# Patient Record
Sex: MALE | Race: ASIAN | NOT HISPANIC OR LATINO | URBAN - METROPOLITAN AREA
[De-identification: names, ages, dates, MRNs, and addresses within clinical notes are randomized per-mention and may not be internally consistent; named-entity substitution may affect disease eponyms.]

---

## 2021-11-30 ENCOUNTER — EMERGENCY (EMERGENCY)
Facility: HOSPITAL | Age: 46
LOS: 1 days | Discharge: HOME | End: 2021-11-30
Attending: EMERGENCY MEDICINE | Admitting: EMERGENCY MEDICINE
Payer: COMMERCIAL

## 2021-11-30 VITALS
TEMPERATURE: 99 F | RESPIRATION RATE: 16 BRPM | DIASTOLIC BLOOD PRESSURE: 74 MMHG | SYSTOLIC BLOOD PRESSURE: 112 MMHG | OXYGEN SATURATION: 99 % | HEART RATE: 60 BPM

## 2021-11-30 VITALS
DIASTOLIC BLOOD PRESSURE: 72 MMHG | TEMPERATURE: 99 F | SYSTOLIC BLOOD PRESSURE: 98 MMHG | OXYGEN SATURATION: 100 % | HEART RATE: 73 BPM | RESPIRATION RATE: 16 BRPM

## 2021-11-30 PROCEDURE — 99284 EMERGENCY DEPT VISIT MOD MDM: CPT

## 2021-11-30 PROCEDURE — 72040 X-RAY EXAM NECK SPINE 2-3 VW: CPT | Mod: 26

## 2021-11-30 RX ORDER — IBUPROFEN 200 MG
600 TABLET ORAL ONCE
Refills: 0 | Status: COMPLETED | OUTPATIENT
Start: 2021-11-30 | End: 2021-11-30

## 2021-11-30 RX ORDER — DIAZEPAM 5 MG
5 TABLET ORAL ONCE
Refills: 0 | Status: DISCONTINUED | OUTPATIENT
Start: 2021-11-30 | End: 2021-11-30

## 2021-11-30 RX ADMIN — Medication 600 MILLIGRAM(S): at 11:29

## 2021-11-30 RX ADMIN — Medication 600 MILLIGRAM(S): at 09:07

## 2021-11-30 RX ADMIN — Medication 5 MILLIGRAM(S): at 09:04

## 2021-11-30 NOTE — ED ADULT TRIAGE NOTE - CHIEF COMPLAINT QUOTE
Pt arrives to ED c/o chest pain and occipital pain for three days.  Left sided CP with no radiation.  Pt left hand is in spasms.  Pt seen at [facility] yesterday for same issues and d/c with Tylenol, ASA and Robaxin.

## 2021-11-30 NOTE — ED PROVIDER NOTE - OBJECTIVE STATEMENT
45 y/o m with no pmhx complains of b/l neck pain, b/l shoulder pain. pt hd stable. states was evaluated at [facility], received a chest xray, muscle relaxants and then states felt better but the pain returned. pt states he feels when the pain comes, it feels electrical. radiating into head with headache. pt moving head and neck in all directions, spontaneous movt. no nuchal rigidity, afebrile. contrary to nursing documentation pt with no chest pain, no sob, no headache. neg encephalitis. no recent travel .  states the pain is localized to the muscle areas. 10/10 pain as stated by pt while he is sitting comfortably on phone. states it is too much for too long, although pain has persisted for 2 days.

## 2021-11-30 NOTE — ED PROVIDER NOTE - CLINICAL SUMMARY MEDICAL DECISION MAKING FREE TEXT BOX
pt p/w b/l paraspinal neck, with upper neck pain, no midline ttp, no neuro deficits, no chest pain. hd stable, neck pain improved. vss. referred to ortho for outpt mri. no red flags.

## 2021-11-30 NOTE — ED PROVIDER NOTE - RESPIRATORY NEGATIVE STATEMENT, MLM
no chest pain, no cough, and no shortness of breath. +b/l paraspinal muscle ttp. paraspinal neck pain.

## 2022-08-05 NOTE — ED PROVIDER NOTE - PATIENT PORTAL LINK FT
Patient's HM shows they are overdue for [place] [id number] and  files searched. No results to attach to order nor HM updated.
You can access the FollowMyHealth Patient Portal offered by [facility] by registering at the following website: [email or web address]. By joining [facility]’s FollowMyHealth portal, you will also be able to view your health information using other applications (apps) compatible with our system.

## 2023-04-04 ENCOUNTER — EMERGENCY (EMERGENCY)
Facility: HOSPITAL | Age: 48
LOS: 1 days | Discharge: HOME | End: 2023-04-04
Attending: EMERGENCY MEDICINE | Admitting: EMERGENCY MEDICINE
Payer: MEDICAID

## 2023-04-04 VITALS
SYSTOLIC BLOOD PRESSURE: 101 MMHG | OXYGEN SATURATION: 100 % | HEART RATE: 52 BPM | RESPIRATION RATE: 17 BRPM | TEMPERATURE: 98 F | DIASTOLIC BLOOD PRESSURE: 55 MMHG

## 2023-04-04 VITALS
OXYGEN SATURATION: 100 % | HEART RATE: 78 BPM | DIASTOLIC BLOOD PRESSURE: 70 MMHG | RESPIRATION RATE: 18 BRPM | TEMPERATURE: 98 F | SYSTOLIC BLOOD PRESSURE: 140 MMHG

## 2023-04-04 PROBLEM — R25.2 CRAMP AND SPASM: Chronic | Status: ACTIVE | Noted: 2021-11-30

## 2023-04-04 PROCEDURE — 76870 US EXAM SCROTUM: CPT | Mod: 26

## 2023-04-04 PROCEDURE — 76770 US EXAM ABDO BACK WALL COMP: CPT | Mod: 26

## 2023-04-04 PROCEDURE — 99284 EMERGENCY DEPT VISIT MOD MDM: CPT

## 2023-04-04 RX ORDER — DIAZEPAM 5 MG
1 TABLET ORAL
Qty: 9 | Refills: 0
Start: 2023-04-04 | End: 2023-04-06

## 2023-04-04 RX ORDER — LIDOCAINE 4 G/100G
1 CREAM TOPICAL ONCE
Refills: 0 | Status: COMPLETED | OUTPATIENT
Start: 2023-04-04 | End: 2023-04-04

## 2023-04-04 RX ORDER — IBUPROFEN 200 MG
1 TABLET ORAL
Qty: 28 | Refills: 0
Start: 2023-04-04 | End: 2023-04-10

## 2023-04-04 RX ORDER — IBUPROFEN 200 MG
600 TABLET ORAL ONCE
Refills: 0 | Status: COMPLETED | OUTPATIENT
Start: 2023-04-04 | End: 2023-04-04

## 2023-04-04 RX ORDER — DIAZEPAM 5 MG
5 TABLET ORAL ONCE
Refills: 0 | Status: DISCONTINUED | OUTPATIENT
Start: 2023-04-04 | End: 2023-04-04

## 2023-04-04 RX ORDER — ACETAMINOPHEN 500 MG
650 TABLET ORAL ONCE
Refills: 0 | Status: COMPLETED | OUTPATIENT
Start: 2023-04-04 | End: 2023-04-04

## 2023-04-04 RX ADMIN — Medication 650 MILLIGRAM(S): at 11:27

## 2023-04-04 RX ADMIN — Medication 650 MILLIGRAM(S): at 10:42

## 2023-04-04 RX ADMIN — Medication 600 MILLIGRAM(S): at 10:42

## 2023-04-04 RX ADMIN — LIDOCAINE 1 PATCH: 4 CREAM TOPICAL at 10:42

## 2023-04-04 RX ADMIN — Medication 600 MILLIGRAM(S): at 11:27

## 2023-04-04 RX ADMIN — Medication 5 MILLIGRAM(S): at 10:42

## 2023-04-04 NOTE — ED ADULT NURSE NOTE - OBJECTIVE STATEMENT
47 year old male received to intake rm 10C AAOx4 ambulatory. Pt c/o acute on chronic lower back pain radiating to testicles and b/l lower extremities rating 10/10. Pt's friend drove him to ER because he is having numbness to left posterior knee. Denies recent injury/trauma/fall. Pt denies urinary symptoms. Pt denies headache, dizziness, chest pain, shortness of breath, n/v/d, fever. Respirations even and unlabored. Pt medicated per MD orders. VS as noted. Pt brought to US

## 2023-04-04 NOTE — ED PROVIDER NOTE - NS ED ATTENDING STATEMENT MOD
This was a shared visit with the PAOLO. I reviewed and verified the documentation and independently performed the documented:

## 2023-04-04 NOTE — ED PROVIDER NOTE - ATTENDING APP SHARED VISIT CONTRIBUTION OF CARE
47-year-old male without significant past medical history reports history intermittent/chronic back pain times years here today complaining of worsening bilateral lower back pain worse with right greater than left radiating into the bilateral testicles, again right greater than left x2 days.  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 Reports pain also radiates down b/l posterior legs with tingling and stiffness in muscles. Denies fever, chills, nausea, vomiting, diarrhea, dysuria, hematuria, incontinence

## 2023-04-04 NOTE — ED PROVIDER NOTE - NSFOLLOWUPINSTRUCTIONS_ED_ALL_ED_FT
Follow with your PMD within 48-72 hours.      Rest, no heavy lifting.      Warm compresses to area.    Recommend Ortho consult to discuss possible MRI vs Physical Therapy- referral list provided.  Light walking.     Take Motrin 600 mg every 8 hours for pain with food,     Valium 5mg every 8 hours as needed for muscle spasm- caution drowsiness/do not drive.     Any worsening pain, weakness, numbness, bowel or urinary incontinence return to ER     Follow up with Urology for Varicocele    Varicocele    A varicocele is a swelling of veins in the scrotum. The scrotum is the sac that contains the testicles. Varicoceles can occur on either side of the scrotum, but they are more common on the left side. They occur most often in teenage boys and young men.    In most cases, varicoceles are not a serious problem. They are usually small and painless and do not require treatment. Tests may be done to confirm the diagnosis. Treatment may be needed if:  A varicocele is large, causes a lot of pain, or causes pain when exercising.  Varicoceles are found on both sides of the scrotum.  A varicocele causes a decrease in the size of the testicle in a growing adolescent.  The person has fertility problems.  What are the causes?  This condition is the result of valves in the veins not working properly. Valves in the veins help to return blood from the scrotum and testicles to the heart. If these valves do not work well, blood flows backward and backs up into the veins, which causes the veins to swell. This is similar to what happens when varicose veins form in the leg.    What are the signs or symptoms?  Most varicoceles do not cause any symptoms. If symptoms do occur, they may include:  Swelling on one side of the scrotum. The swelling may be more obvious when you are standing up.  A lumpy feeling in the scrotum.  A heavy feeling on one side of the scrotum.  A dull ache in the scrotum, especially after exercise or prolonged standing or sitting.  Slower growth or reduced size of the testicle on the side of the varicocele (in young males).  Problems with fathering a child (fertility). This can occur if the testicle does not grow normally or if the condition causes problems with the sperm, such as a low sperm count or sperm that are not able to reach the egg (poor motility).  How is this diagnosed?  This condition is diagnosed based on:  Your medical history.  A physical exam. Your health care provider may inspect and feel (palpate) the scrotal area to check for swollen or enlarged veins.  An ultrasound. This may be done to confirm the diagnosis and to help rule out other causes of the swelling.  How is this treated?  Treatment is usually not needed for this condition. If you have any pain, your health care provider may prescribe or recommend medicine to help relieve it. You may need regular exams so your health care provider can monitor the varicocele to ensure that it does not cause problems.    When further treatment is needed, it may involve one of these options:  Varicocelectomy. This is a surgery in which the swollen veins are tied off so that the flow of blood goes to other veins instead.  Embolization. In this procedure, a small, thin tube (catheter) is used to place metal coils or other blocking items in the veins. This cuts off the blood flow to the swollen veins.  Follow these instructions at home:  Take over-the-counter and prescription medicines only as told by your health care provider.  Wear supportive underwear.  Use an athletic supporter when participating in sports activities.  Keep all follow-up visits as told by your health care provider. This is important.  Contact a health care provider if:  Your pain is increasing.  You have redness in the affected area.  Your testicle becomes enlarged, swollen, or painful.  You have swelling that does not decrease when you are lying down.  One of your testicles is smaller than the other.  Get help right away if:  You develop swelling in your legs.  You have difficulty breathing.  Summary  Varicocele is a condition in which the veins in the scrotum are swollen or enlarged.  In most cases, varicoceles do not require treatment.  Treatment may be needed if you have pain, have problems with infertility, or have a smaller testicle associated with the varicocele.  In some cases, the condition may be treated with a procedure to cut off the flow of blood to the swollen veins.  This information is not intended to replace advice given to you by your health care provider. Make sure you discuss any questions you have with your health care provider.

## 2023-04-04 NOTE — ED ADULT TRIAGE NOTE - CHIEF COMPLAINT QUOTE
Pt with co lower back pain since last night pain radiates into testicle area. Pt states no swelling to testicles no discharge  from penis no urinary problem. Pt denies nausea or vomiting no diarrhea. Pt states pain also radiates to legs.

## 2023-04-04 NOTE — ED PROVIDER NOTE - PATIENT PORTAL LINK FT
You can access the FollowMyHealth Patient Portal offered by [facility] by registering at the following website: [email or web address]. By joining [facility]’s FollowMyHealth portal, you will also be able to view your health information using other applications (apps) compatible with our system.

## 2023-04-04 NOTE — ED PROVIDER NOTE - PROGRESS NOTE DETAILS
Patient feeling improved after medication.  Ultrasound revealed no hydro nephrosis, no hydrocele no torsion no epididymitis.  It did show right-sided varicocele.  Will set patient up with urology as an outpatient and will DC with few days of medication.  Patient is amenable to plan for discharge and will follow-up with urology and primary medical doctor. Discharge center helping with urology f/u

## 2023-04-04 NOTE — ED PROVIDER NOTE - CLINICAL SUMMARY MEDICAL DECISION MAKING FREE TEXT BOX
47-year-old male without significant past medical history reports history intermittent/chronic back pain times years here today complaining of worsening bilateral lower back pain worse with right greater than left radiating into the bilateral testicles, again right greater than left x2 days.  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 Reports pain also radiates down b/l posterior legs with tingling and stiffness in muscles.   plan meds, US testicles, US renal, reassess, likely outpatient, PMD/ Ortho f/u

## 2024-04-22 NOTE — ED PROVIDER NOTE - CROS ED HEME ALL NEG
The patient's goals for the shift include  comfort and support    The clinical goals for the shift include  comfort and support       negative...

## 2024-12-03 ENCOUNTER — EMERGENCY (EMERGENCY)
Facility: HOSPITAL | Age: 49
LOS: 1 days | Discharge: HOME | End: 2024-12-03
Attending: STUDENT IN AN ORGANIZED HEALTH CARE EDUCATION/TRAINING PROGRAM | Admitting: EMERGENCY MEDICINE
Payer: MEDICAID

## 2024-12-03 VITALS
HEIGHT: 66 IN | HEART RATE: 75 BPM | DIASTOLIC BLOOD PRESSURE: 88 MMHG | TEMPERATURE: 98 F | OXYGEN SATURATION: 97 % | SYSTOLIC BLOOD PRESSURE: 133 MMHG | RESPIRATION RATE: 18 BRPM | WEIGHT: 128.09 LBS

## 2024-12-03 LAB
ADD ON TEST-SPECIMEN IN LAB: SIGNIFICANT CHANGE UP
ANION GAP SERPL CALC-SCNC: 13 MMOL/L — SIGNIFICANT CHANGE UP (ref 7–14)
BASOPHILS # BLD AUTO: 0.03 K/UL — SIGNIFICANT CHANGE UP (ref 0–0.2)
BASOPHILS NFR BLD AUTO: 0.4 % — SIGNIFICANT CHANGE UP (ref 0–2)
BUN SERPL-MCNC: 16 MG/DL — SIGNIFICANT CHANGE UP (ref 7–23)
CALCIUM SERPL-MCNC: 9.3 MG/DL — SIGNIFICANT CHANGE UP (ref 8.4–10.5)
CHLORIDE SERPL-SCNC: 106 MMOL/L — SIGNIFICANT CHANGE UP (ref 98–107)
CO2 SERPL-SCNC: 21 MMOL/L — LOW (ref 22–31)
CREAT SERPL-MCNC: 0.92 MG/DL — SIGNIFICANT CHANGE UP (ref 0.5–1.3)
EGFR: 102 ML/MIN/1.73M2 — SIGNIFICANT CHANGE UP
EOSINOPHIL # BLD AUTO: 0.06 K/UL — SIGNIFICANT CHANGE UP (ref 0–0.5)
EOSINOPHIL NFR BLD AUTO: 0.8 % — SIGNIFICANT CHANGE UP (ref 0–6)
GLUCOSE SERPL-MCNC: 86 MG/DL — SIGNIFICANT CHANGE UP (ref 70–99)
HCT VFR BLD CALC: 39.1 % — SIGNIFICANT CHANGE UP (ref 39–50)
HGB BLD-MCNC: 13.5 G/DL — SIGNIFICANT CHANGE UP (ref 13–17)
IANC: 3.8 K/UL — SIGNIFICANT CHANGE UP (ref 1.8–7.4)
IMM GRANULOCYTES NFR BLD AUTO: 0.3 % — SIGNIFICANT CHANGE UP (ref 0–0.9)
LYMPHOCYTES # BLD AUTO: 3.49 K/UL — HIGH (ref 1–3.3)
LYMPHOCYTES # BLD AUTO: 44.1 % — HIGH (ref 13–44)
MCHC RBC-ENTMCNC: 31.8 PG — SIGNIFICANT CHANGE UP (ref 27–34)
MCHC RBC-ENTMCNC: 34.5 G/DL — SIGNIFICANT CHANGE UP (ref 32–36)
MCV RBC AUTO: 92.2 FL — SIGNIFICANT CHANGE UP (ref 80–100)
MONOCYTES # BLD AUTO: 0.52 K/UL — SIGNIFICANT CHANGE UP (ref 0–0.9)
MONOCYTES NFR BLD AUTO: 6.6 % — SIGNIFICANT CHANGE UP (ref 2–14)
NEUTROPHILS # BLD AUTO: 3.8 K/UL — SIGNIFICANT CHANGE UP (ref 1.8–7.4)
NEUTROPHILS NFR BLD AUTO: 47.8 % — SIGNIFICANT CHANGE UP (ref 43–77)
NRBC # BLD: 0 /100 WBCS — SIGNIFICANT CHANGE UP (ref 0–0)
NRBC # FLD: 0 K/UL — SIGNIFICANT CHANGE UP (ref 0–0)
PLATELET # BLD AUTO: 204 K/UL — SIGNIFICANT CHANGE UP (ref 150–400)
POTASSIUM SERPL-MCNC: 3.9 MMOL/L — SIGNIFICANT CHANGE UP (ref 3.5–5.3)
POTASSIUM SERPL-SCNC: 3.9 MMOL/L — SIGNIFICANT CHANGE UP (ref 3.5–5.3)
RBC # BLD: 4.24 M/UL — SIGNIFICANT CHANGE UP (ref 4.2–5.8)
RBC # FLD: 14.6 % — HIGH (ref 10.3–14.5)
SODIUM SERPL-SCNC: 140 MMOL/L — SIGNIFICANT CHANGE UP (ref 135–145)
WBC # BLD: 7.92 K/UL — SIGNIFICANT CHANGE UP (ref 3.8–10.5)
WBC # FLD AUTO: 7.92 K/UL — SIGNIFICANT CHANGE UP (ref 3.8–10.5)

## 2024-12-03 PROCEDURE — 99223 1ST HOSP IP/OBS HIGH 75: CPT

## 2024-12-03 RX ORDER — OXYCODONE HYDROCHLORIDE 30 MG/1
5 TABLET ORAL EVERY 6 HOURS
Refills: 0 | Status: DISCONTINUED | OUTPATIENT
Start: 2024-12-03 | End: 2024-12-04

## 2024-12-03 RX ORDER — KETOROLAC TROMETHAMINE 30 MG/ML
30 INJECTION INTRAMUSCULAR; INTRAVENOUS ONCE
Refills: 0 | Status: DISCONTINUED | OUTPATIENT
Start: 2024-12-03 | End: 2024-12-03

## 2024-12-03 RX ORDER — OXYCODONE AND ACETAMINOPHEN 5; 325 MG/1; MG/1
1 TABLET ORAL ONCE
Refills: 0 | Status: DISCONTINUED | OUTPATIENT
Start: 2024-12-03 | End: 2024-12-03

## 2024-12-03 RX ORDER — KETOROLAC TROMETHAMINE 30 MG/ML
15 INJECTION INTRAMUSCULAR; INTRAVENOUS EVERY 6 HOURS
Refills: 0 | Status: DISCONTINUED | OUTPATIENT
Start: 2024-12-03 | End: 2024-12-04

## 2024-12-03 RX ORDER — LIDOCAINE 40 MG/G
1 CREAM TOPICAL ONCE
Refills: 0 | Status: COMPLETED | OUTPATIENT
Start: 2024-12-03 | End: 2024-12-03

## 2024-12-03 RX ORDER — DIAZEPAM 10 MG/1
5 TABLET ORAL ONCE
Refills: 0 | Status: DISCONTINUED | OUTPATIENT
Start: 2024-12-03 | End: 2024-12-03

## 2024-12-03 RX ORDER — ACETAMINOPHEN 500MG 500 MG/1
975 TABLET, COATED ORAL EVERY 6 HOURS
Refills: 0 | Status: ACTIVE | OUTPATIENT
Start: 2024-12-03 | End: 2025-11-01

## 2024-12-03 RX ORDER — CYCLOBENZAPRINE HCL 10 MG
10 TABLET ORAL THREE TIMES A DAY
Refills: 0 | Status: ACTIVE | OUTPATIENT
Start: 2024-12-03 | End: 2025-11-01

## 2024-12-03 RX ORDER — LIDOCAINE 40 MG/G
1 CREAM TOPICAL DAILY
Refills: 0 | Status: ACTIVE | OUTPATIENT
Start: 2024-12-03 | End: 2025-11-01

## 2024-12-03 RX ADMIN — KETOROLAC TROMETHAMINE 30 MILLIGRAM(S): 30 INJECTION INTRAMUSCULAR; INTRAVENOUS at 15:48

## 2024-12-03 RX ADMIN — DIAZEPAM 5 MILLIGRAM(S): 10 TABLET ORAL at 13:34

## 2024-12-03 RX ADMIN — OXYCODONE AND ACETAMINOPHEN 1 TABLET(S): 5; 325 TABLET ORAL at 13:34

## 2024-12-03 RX ADMIN — OXYCODONE AND ACETAMINOPHEN 1 TABLET(S): 5; 325 TABLET ORAL at 15:48

## 2024-12-03 RX ADMIN — OXYCODONE HYDROCHLORIDE 5 MILLIGRAM(S): 30 TABLET ORAL at 23:32

## 2024-12-03 RX ADMIN — KETOROLAC TROMETHAMINE 30 MILLIGRAM(S): 30 INJECTION INTRAMUSCULAR; INTRAVENOUS at 13:35

## 2024-12-03 NOTE — ED CDU PROVIDER INITIAL DAY NOTE - PROGRESS NOTE DETAILS
YUMIKO Ferguson: pt received at sign out pending MRI, sleeping at time of reassessment will continue to monitor.

## 2024-12-03 NOTE — ED ADULT NURSE REASSESSMENT NOTE - NS ED NURSE REASSESS COMMENT FT1
Pt denies SOB, dizziness, reported 9 out of 10 back pain plan, oxycodone administered as ordered, plan is for pain management and MRI, safety maintained, call bell with in reach, will continue to monitor.

## 2024-12-03 NOTE — ED PROVIDER NOTE - CLINICAL SUMMARY MEDICAL DECISION MAKING FREE TEXT BOX
49-year-old male no past medical history presented emergency room complaining of lower back pain rating to his legs x 1 week  Likely radiculopathy low concern for cord compression at this time but unable to do full exam due to pain  Will treat with NSAIDs/opioids  Will reassess physical exam after pain is under control if neurodeficits noted would consider MRI for further evaluation

## 2024-12-03 NOTE — ED CDU PROVIDER INITIAL DAY NOTE - DETAILS
50 y/o male c/o lower back pain x 1 week and intermittent numbness x 1 month  -likely radiculopathy, r/o spinal cord injury vs disc disease  -pain control  -MRI lumbosacral spine

## 2024-12-03 NOTE — ED CDU PROVIDER INITIAL DAY NOTE - OBJECTIVE STATEMENT
49-year-old male no past medical history presents emergency room complaining of lower back pain x 1 week.  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  Patient proceeded to pull his car over while driving and had a family bring him to the ER.  Patient states he is able to ambulate with pain states legs feels weak as well.  Patient denies saddle anesthesia urinary incontinence fecal incontinence recent trauma or history of back issues in the past.    CDU Note: Agree with above. 48 y/o male presents to ER c/o lower back pain x 1 week w/ intermittent numbness to groind x 1 month. In ER given nsaids percocet and valium with some relief - placed in CDU for pain control and MRI L spine. Pt. currently resting comfortably.

## 2024-12-03 NOTE — ED ADULT TRIAGE NOTE - CHIEF COMPLAINT QUOTE
Pt presents to ED ambulatory from home with c/o back pain radiating down bilateral legs since last night. Pt denies fall injury, heavy lifting or trauma. Pt denies past medical hx.

## 2024-12-03 NOTE — ED PROVIDER NOTE - PROGRESS NOTE DETAILS
YUMIKO Wilde - patient reassessed - states feeling better but still c/o significant pain - marylou to ambulate without difficulty but states very painful. Also endorsing intermittent groin numbness x 1 month which patient did not endorse earlier. Pt. with benign neuro exam at this time - will placed in CDU for further pain control and MRI to r/o significant disc disease or spinal cord pathology.

## 2024-12-03 NOTE — ED PROVIDER NOTE - PHYSICAL EXAMINATION
Gen: Well appearing in NAD  Head: NC/AT  Neck: trachea midline  Resp:  No distress  Ext: no deformities  Neuro:  A&O appears non focal  Skin:  Warm and dry as visualized  Psych:  Normal affect and mood     Back: No midline tenderness no swelling or obvious deformity decreased range of motion secondary to pain.  Difficulty doing with straight leg raise secondary to pain muscle strength 5 out of 5 bilateral feet with ankle dorsiflexion plantarflexion.  Sensations intact no signs of saddle anesthesia.  Patient unable to ambulate currently due to pain.

## 2024-12-03 NOTE — ED ADULT NURSE NOTE - NSFALLRISKINTERV_ED_ALL_ED

## 2024-12-03 NOTE — ED ADULT NURSE NOTE - OBJECTIVE STATEMENT
Pt received in wellness area A&Ox3. C/o lower back pain x 1 week.  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 Today felt weakness to his legs. Patient states he is able to ambulate with pain. pt denies any trauma or injuries. Ongoing eval in progress.

## 2024-12-03 NOTE — ED PROVIDER NOTE - ATTENDING APP SHARED VISIT CONTRIBUTION OF CARE
49-year-old male no past medical history presents emergency room with lower back pain for 1 week.  Has been worsening over the past week.  Patient states he has some saddle anesthesia and difficulty initiating a stream.  He has no history of trauma.  No step-offs.  Patient is ambulatory with antalgic gait.  Pain was described as severe.  Patient was placed in the CDU for MRI to rule out cauda equina syndrome.  CBC within normal limits, BMP nonactionable.

## 2024-12-03 NOTE — ED PROVIDER NOTE - OBJECTIVE STATEMENT
49-year-old male no past medical history presents emergency room complaining of lower back pain x 1 week.  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  Patient proceeded to pull his car over while driving and had a family bring him to the ER.  Patient states he is able to ambulate with pain states legs feels weak as well.  Patient denies saddle anesthesia urinary incontinence fecal incontinence recent trauma or history of back issues in the past.

## 2024-12-04 VITALS
SYSTOLIC BLOOD PRESSURE: 120 MMHG | TEMPERATURE: 98 F | DIASTOLIC BLOOD PRESSURE: 70 MMHG | RESPIRATION RATE: 18 BRPM | HEART RATE: 55 BPM | OXYGEN SATURATION: 98 %

## 2024-12-04 PROCEDURE — 99239 HOSP IP/OBS DSCHRG MGMT >30: CPT

## 2024-12-04 PROCEDURE — 72148 MRI LUMBAR SPINE W/O DYE: CPT | Mod: 26,MC

## 2024-12-04 RX ORDER — ACETAMINOPHEN 500MG 500 MG/1
2 TABLET, COATED ORAL
Qty: 80 | Refills: 0
Start: 2024-12-04 | End: 2024-12-13

## 2024-12-04 RX ORDER — LORAZEPAM 2 MG/1
0.5 TABLET ORAL ONCE
Refills: 0 | Status: DISCONTINUED | OUTPATIENT
Start: 2024-12-04 | End: 2024-12-04

## 2024-12-04 RX ORDER — DIAZEPAM 10 MG/1
1 TABLET ORAL
Qty: 9 | Refills: 0
Start: 2024-12-04 | End: 2024-12-06

## 2024-12-04 RX ORDER — IBUPROFEN 200 MG
1 TABLET ORAL
Qty: 20 | Refills: 0
Start: 2024-12-04

## 2024-12-04 RX ADMIN — OXYCODONE HYDROCHLORIDE 5 MILLIGRAM(S): 30 TABLET ORAL at 07:03

## 2024-12-04 RX ADMIN — OXYCODONE HYDROCHLORIDE 5 MILLIGRAM(S): 30 TABLET ORAL at 00:02

## 2024-12-04 RX ADMIN — OXYCODONE HYDROCHLORIDE 5 MILLIGRAM(S): 30 TABLET ORAL at 07:40

## 2024-12-04 RX ADMIN — LORAZEPAM 0.5 MILLIGRAM(S): 2 TABLET ORAL at 00:48

## 2024-12-04 RX ADMIN — LIDOCAINE 1 PATCH: 40 CREAM TOPICAL at 13:34

## 2024-12-04 RX ADMIN — KETOROLAC TROMETHAMINE 15 MILLIGRAM(S): 30 INJECTION INTRAMUSCULAR; INTRAVENOUS at 13:34

## 2024-12-04 NOTE — ED CDU PROVIDER DISPOSITION NOTE - CLINICAL COURSE
49-year-old male no past medical history presents emergency room complaining of lower back pain x 1 week.  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  Patient proceeded to pull his car over while driving and had a family bring him to the ER.  Patient states he is able to ambulate with pain states legs feels weak as well.  Patient denies saddle anesthesia urinary incontinence fecal incontinence recent trauma or history of back issues in the past. CDU Note: Agree with above. 50 y/o male presents to ER c/o lower back pain x 1 week w/ intermittent numbness to groind x 1 month. In ER given nsaids percocet and valium with some relief - placed in CDU for pain control and MRI L spine. Pt. currently resting comfortably. Pt seen during morning rounds- states he feels better, neuro non-focal. ambulatory in the ED with steady gait. MRI lumbar spine shows " L5-S1:   Broad shallow left central disc protrusion  (disc herniation)". Pt is clinically stable for discharge home. Spine follow up. strict return precarions.

## 2024-12-04 NOTE — ED CDU PROVIDER DISPOSITION NOTE - NSFOLLOWUPINSTRUCTIONS_ED_ALL_ED_FT
Take Valium 5mg every 8hrs as needed for muscle spasm- caution drowsiness when taking this medication. Do not drive or operate heavy machinery.    Take Tylenol 650mg every 6 hours and/or Ibuprofen 600mg every 6 hours for pain control.    Follow up with your PMD within 1-2 days or you can call our clinic at [phone] for an appointment  Take all of your other medications as previously prescribed.  Worsening, continued or ANY new concerning symptoms return to the Emergency Department.    Please [facility] with [facility] as scheduled.    Herniated Disk    A herniated disk, also called a ruptured disk or slipped disk, occurs when a disk in the spine bulges out too far. Between the bones in the spine (vertebrae), there are oval disks that are made of a soft, spongy center filled with liquid that is surrounded by a tough outer ring. The disks connect the vertebrae, help the spine move, and keep the bones from rubbing against each other when you move.    When you have a herniated disk, the spongy center of the disk bulges out or breaks through the outer ring. The spongy center can press on a nerve between the vertebrae and cause pain. This can occur anywhere in the back or neck area, but the lower back is most commonly affected.    What are the causes?  This condition may be caused by:  Age-related wear and tear.  Sudden injury, such as a strain or sprain.  What increases the risk?  The following factors may make you more likely to develop this condition:  Aging. This is the main risk factor for a herniated disk.  Being a man who is 30–50 years old.  Frequently doing activities that involve heavy lifting, bending, or twisting.  Not getting enough exercise.  Being overweight.  Using tobacco products.  What are the signs or symptoms?  Symptoms may vary depending on where your herniated disk is located.  A herniated disk in the lower back may cause:  Sharp pain in part of the hips, buttocks, or legs.  Sharp pain in the lower back, spreading down through the leg into the foot (sciatica).  A herniated disk in the neck may cause dizziness and vertigo. It may also cause pain or weakness in the neck, shoulder, or upper arm, forearm, or fingers.  You may also have muscle weakness. You may have trouble:  Lifting your leg or arm.  Standing on your toes.  Squeezing tightly with one of your hands.  Other symptoms may include:  Numbness or tingling in the affected areas of the hands, arms, feet, or legs.  Inability to control when you urinate or when you have bowel movements. This is a rare but serious sign of a severe herniated disk in the lower back.  How is this diagnosed?  This condition may be diagnosed based on:  Your symptoms.  Your medical history.  A physical exam. The exam may include:  A straight-leg test. For this test, you will lie on your back while your health care provider lifts your leg, keeping your knee straight. If you feel pain, you likely have a herniated disk.  Neurologic tests. These include checking for numbness, reflexes, muscle strength, and problems with posture.  Imaging tests, such as:  X-rays.  MRI.  CT scan.  Electromyogram (EMG) to check the nerves that control muscles.  How is this treated?  Treatment for this condition may include:  A period of light, painless activity for a few days to several weeks. Complete bed rest is not recommended.  If you have a herniated disk in your lower back, avoid sitting as it increases pressure on the disk.  Medicines. These may include:  NSAIDs, such as ibuprofen, to help reduce pain and swelling.  Muscle relaxants to prevent sudden tightening of the back muscles (back spasms).  Prescription pain medicines, if you have severe pain.  Ice or heat therapy.  Steroid injections in the area of the herniated disk. These can help reduce pain and swelling.  Physical therapy to strengthen your back muscles.  In many cases, symptoms go away with treatment over a period of days or weeks. You will most likely be free of symptoms after 3–4 months. If other treatments do not help to relieve your symptoms, you may need surgery.    Follow these instructions at home:  Medicines    Take over-the-counter and prescription medicines only as told by your health care provider.  Ask your health care provider if the medicine prescribed to you:  Requires you to avoid driving or using heavy machinery.  Can cause constipation. You may need to take these actions to prevent or treat constipation:  Drink enough fluid to keep your urine pale yellow.  Take over-the-counter or prescription medicines.  Eat foods that are high in fiber, such as beans, whole grains, and fresh fruits and vegetables.  Limit foods that are high in fat and processed sugars, such as fried or sweet foods.  Managing pain, stiffness, and swelling        If directed, put ice on the painful area. Icing can help to relieve pain. To do this:  Put ice in a plastic bag.  Place a towel between your skin and the bag.  Leave the ice on for 20 minutes, 2–3 times a day.  Remove the ice if your skin turns bright red. This is very important. If you cannot feel pain, heat, or cold, you have a greater risk of damage to the area.  If directed, apply heat to the painful area as often as told by your health care provider. Heat can reduce the stiffness of your muscles. Use the heat source that your health care provider recommends, such as a moist heat pack or a heating pad.  Place a towel between your skin and the heat source.  Leave the heat on for 20–30 minutes.  Remove the heat if your skin turns bright red. This is especially important if you are unable to feel pain, heat, or cold. You may have a greater risk of getting burned.  Activity    Avoid strict bed rest but only do activities that are painless.  Gradually return to your normal activities and exercise as told by your health care provider. Ask your health care provider what activities and exercises are safe for you.  Use good posture.  Avoid movements that cause pain.  Do not lift anything that is heavier than 10 lb (4.5 kg), or the limit that you are told, until your health care provider says that it is safe.  Do not sit or stand for long periods of time without changing positions.  Do not sit for long periods of time without getting up and moving around.  If physical therapy was prescribed, do exercises as told by your health care provider.  Aim to strengthen muscles in your back and abdomen with exercises such as swimming or walking.  General instructions    Do not use any products that contain nicotine or tobacco, such as cigarettes, e-cigarettes, and chewing tobacco. These products can delay healing. If you need help quitting, ask your health care provider.  Do not wear high-heeled shoes.  Do not sleep on your abdomen.  If you are overweight, work with your health care provider to lose weight safely.  Keep all follow-up visits. This is important.  How is this prevented?  Maintain a healthy weight.  Maintain physical fitness. Do at least 150 minutes of moderate-intensity exercise each week, such as brisk walking or water aerobics.  When lifting objects:  Keep your feet at least shoulder-width apart and tighten the muscles of your abdomen.  Keep your spine neutral as you bend your knees and hips. It is important to lift using the strength of your legs, not your back. Do not lock your knees straight out.  Always ask for help to lift heavy or awkward objects.  Contact a health care provider if you:  Have back pain or neck pain that does not get better after 6 weeks.  Have severe pain in your back, neck, legs, or arms.  Develop numbness, tingling, or weakness anywhere in your body.  Get help right away if:  You cannot move your arms or legs.  You cannot control when you urinate or have bowel movements.  You feel dizzy or you faint.  You have shortness of breath.  These symptoms may represent a serious problem that is an emergency. Do not wait to see if the symptoms will go away. Get medical help right away. Call your local emergency services (911 in the [place] Do not drive yourself to the hospital.    Summary  A herniated disk, also called a ruptured disk or slipped disk, occurs when a disk in the spine bulges out too far (herniates).  This condition may be caused by age-related wear and tear or a sudden injury.  Symptoms may vary depending on where your herniated disk is located.  Treatment may include rest, medicines, ice or heat therapy, steroid injections, and physical therapy.  If other treatments do not help to relieve your symptoms, you may need surgery.

## 2024-12-04 NOTE — ED CDU PROVIDER SUBSEQUENT DAY NOTE - CLINICAL SUMMARY MEDICAL DECISION MAKING FREE TEXT BOX
48 y/o male c/o lower back pain x 1 week and intermittent numbness x 1 month  -likely radiculopathy, r/o spinal cord injury vs disc disease  -pain control  -MRI lumbosacral spine

## 2024-12-04 NOTE — ED CDU PROVIDER SUBSEQUENT DAY NOTE - HISTORY
Interval hx- pt underwent MRI pending read. Pain managed through the night.    Pt is a 50 y/o male c/o lower back pain x 1 week and intermittent numbness x 1 month

## 2024-12-04 NOTE — ED CDU PROVIDER DISPOSITION NOTE - PATIENT PORTAL LINK FT
You can access the FollowMyHealth Patient Portal offered by [facility] by registering at the following website: [email or web address]. By joining [employer]’s FollowMyHealth portal, you will also be able to view your health information using other applications (apps) compatible with our system.

## 2024-12-04 NOTE — ED CDU PROVIDER SUBSEQUENT DAY NOTE - PHYSICAL EXAMINATION
CONSTITUTIONAL: Well-appearing; well-nourished; in no apparent distress. Non-toxic appearing.   NEURO: Alert & oriented. Gait steady without assistance. Sensory and motor functions are grossly intact.  PSYCH: Mood appropriate. Thought processes intact.   NECK: Supple  CARD: Regular rate and rhythm, no murmurs  RESP: No accessory muscle use; breath sounds clear and equal bilaterally; no wheezes, rhonchi, or rales     ABD: Soft; non-distended; non-tender. No guarding or rebound.   MUSCULOSKELETAL/EXTREMITIES: FROM in all four extremities; no extremity edema.  Back: No obvious deformity, ecchymosis, contusions, or rash. There is no bony tenderness to palpation. No paraspinous muscle tenderness. ROM intact but painful with flexion/extension.   SKIN: Warm; dry; no apparent lesions or exudate

## 2024-12-04 NOTE — ED CDU PROVIDER SUBSEQUENT DAY NOTE - PROGRESS NOTE DETAILS
pt resting comfortably awaiting MRI results   reviewed all results   will monitor pain level  and if feeling better and MRI normal will dc home